# Patient Record
Sex: MALE | URBAN - METROPOLITAN AREA
[De-identification: names, ages, dates, MRNs, and addresses within clinical notes are randomized per-mention and may not be internally consistent; named-entity substitution may affect disease eponyms.]

---

## 2023-05-09 ENCOUNTER — LAB (OUTPATIENT)
Dept: LAB | Facility: CLINIC | Age: 23
End: 2023-05-09

## 2023-05-09 ENCOUNTER — MEDICAL CORRESPONDENCE (OUTPATIENT)
Dept: HEALTH INFORMATION MANAGEMENT | Facility: CLINIC | Age: 23
End: 2023-05-09

## 2023-05-09 DIAGNOSIS — Z31.440 ENCOUNTER OF MALE FOR TESTING FOR GENETIC DISEASE CARRIER STATUS FOR PROCREATIVE MANAGEMENT: Primary | ICD-10-CM

## 2023-05-09 DIAGNOSIS — Z31.440 ENCOUNTER OF MALE FOR TESTING FOR GENETIC DISEASE CARRIER STATUS FOR PROCREATIVE MANAGEMENT: ICD-10-CM

## 2023-05-09 PROCEDURE — 36415 COLL VENOUS BLD VENIPUNCTURE: CPT

## 2023-05-09 NOTE — PROGRESS NOTES
[facility]  Genetic Counseling Consult    Patient:  Rajendra Pena YOB: 2000   Date of Service:  05/09/23      Rajendra was seen at the [facility] for genetic consultation to discuss the option of carrier screening.         Impression/Plan:   1. Rajendra elected to pursue expanded carrier screening as part of his partner, Elizabeth's ongoing pregnancy management. Benefits and limitations of testing were discussed today. A sample was drawn today and sent to [facility] laboratory. Results are expected within 2-3 weeks. We will contact the couple to discuss the results. The couple have requested that results be held until both partners' results have returned and that we call Elizabeth with the couple's results.  Authorization to share protected health information was signed today for us to discuss results with his partner, Elizabeth.    It was a pleasure to be involved with Rajendra's care.    Arcelia Blanco [facility]  Licensed Genetic Counselor  [facility]  Maternal Fetal Medicine  Phone: [phone]  [email or web address]

## 2023-05-22 LAB — SCANNED LAB RESULT: NORMAL

## 2023-06-02 ENCOUNTER — TELEPHONE (OUTPATIENT)
Dept: MATERNAL FETAL MEDICINE | Facility: CLINIC | Age: 23
End: 2023-06-02

## 2023-06-02 NOTE — TELEPHONE ENCOUNTER
June 2, 2023     Called Rajendra' partner Elizabeth per plan established at time of genetic counseling visit to discuss the couple's expanded carrier screening results ([facility] Comprehensive Carrier Screen - 558 genes for Elizabeth, 557 genes excluding FMR1 for Rajendra).     Elizabeth was found to be a carrier for one condition on the panel (described in detail below): alpha thalassemia    Elizabeth 's partner, Rajendra was not found to be a carrier for any of the conditions on the panel.     Overall, Elizabeth and Rajendra were not found to be a carrier for the same condition. Therefore, the current pregnancy and any future conceptions between the couple are at low risk of any of the conditions included on this panel.      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    - Individuals typically have 4 copies of the alpha globin gene. Elizabeth was identified to have a deletion of one copy of the alpha globin gene, which is consistent with silent carrier status (aa/a-).   - Since Rajendra was NOT identified to be a carrier of this condition, the risk that the pregnancy is affected is significantly reduced. The residual risk provided by [facility] laboratory is 1 in 964.               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 Pseudodeficiency alleles are not known to be associated with disease. Elizabeth is NOT a carrier of Krabbe disease.     Rajendra was additionally found to carry a pseudodeficiency allele in [facility]. Pseudodeficiency alleles are not known to be associated with disease. Rajendra is NOT a carrier of Krabbe disease.   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 This variant is unlikely to be associated with nephrotic syndrome when homozygous, but may be pathogenic when in trans from a second pathogenic NPHS2 variant. Rajendra' carrier screen was negative for NPHS2, which significantly reduces the chance that the pregnancy is affected with this condition.      We reviewed the following:     No further screening or testing for the couple or a future pregnancy is indicated.  Again, while the chances of the aforementioned conditions are low, because the detection rate of testing is not 100%, if there is ever concern for symptoms in the couple's children in the future, referral to an appropriate practitioner for evaluation is recommended.    If we are notified by the performing laboratory of a variant reclassification, the patient will be contacted.     The couple's children will have a 50% chance of being an unaffected carrier of the aforementioned conditions.  Genetic counseling for the couple's children is recommended when they are of reproductive age.    Elizabeth can share this information with relatives if they feel comfortable. Siblings would be at a 50% chance of also being a carrier for the same condition.      A copy of this result will be available in [facility]. Rajendra has access to this result via [email or web address].      Arcelia Blanco, [facility], [facility]  Licensed Genetic Counselor  [facility]  Maternal Fetal Medicine  Phone: [phone]  [email or web address]